# Patient Record
Sex: FEMALE | Race: WHITE | NOT HISPANIC OR LATINO
[De-identification: names, ages, dates, MRNs, and addresses within clinical notes are randomized per-mention and may not be internally consistent; named-entity substitution may affect disease eponyms.]

---

## 2024-07-05 ENCOUNTER — NON-APPOINTMENT (OUTPATIENT)
Age: 39
End: 2024-07-05

## 2024-07-05 PROBLEM — Z00.00 ENCOUNTER FOR PREVENTIVE HEALTH EXAMINATION: Status: ACTIVE | Noted: 2024-07-05

## 2024-08-26 ENCOUNTER — APPOINTMENT (OUTPATIENT)
Dept: BREAST CENTER | Facility: CLINIC | Age: 39
End: 2024-08-26
Payer: COMMERCIAL

## 2024-08-26 VITALS
WEIGHT: 152 LBS | HEART RATE: 85 BPM | DIASTOLIC BLOOD PRESSURE: 69 MMHG | SYSTOLIC BLOOD PRESSURE: 116 MMHG | HEIGHT: 63 IN | OXYGEN SATURATION: 98 % | BODY MASS INDEX: 26.93 KG/M2

## 2024-08-26 DIAGNOSIS — Z80.1 FAMILY HISTORY OF MALIGNANT NEOPLASM OF TRACHEA, BRONCHUS AND LUNG: ICD-10-CM

## 2024-08-26 DIAGNOSIS — Z80.0 FAMILY HISTORY OF MALIGNANT NEOPLASM OF DIGESTIVE ORGANS: ICD-10-CM

## 2024-08-26 DIAGNOSIS — R92.30 INCONCLUSIVE MAMMOGRAM: ICD-10-CM

## 2024-08-26 DIAGNOSIS — Z80.3 FAMILY HISTORY OF MALIGNANT NEOPLASM OF BREAST: ICD-10-CM

## 2024-08-26 DIAGNOSIS — K90.0 CELIAC DISEASE: ICD-10-CM

## 2024-08-26 DIAGNOSIS — Z87.891 PERSONAL HISTORY OF NICOTINE DEPENDENCE: ICD-10-CM

## 2024-08-26 DIAGNOSIS — Z86.19 PERSONAL HISTORY OF OTHER INFECTIOUS AND PARASITIC DISEASES: ICD-10-CM

## 2024-08-26 DIAGNOSIS — Z12.39 ENCOUNTER FOR OTHER SCREENING FOR MALIGNANT NEOPLASM OF BREAST: ICD-10-CM

## 2024-08-26 DIAGNOSIS — N64.89 OTHER SPECIFIED DISORDERS OF BREAST: ICD-10-CM

## 2024-08-26 DIAGNOSIS — Z78.9 OTHER SPECIFIED HEALTH STATUS: ICD-10-CM

## 2024-08-26 DIAGNOSIS — R92.8 OTHER ABNORMAL AND INCONCLUSIVE FINDINGS ON DIAGNOSTIC IMAGING OF BREAST: ICD-10-CM

## 2024-08-26 DIAGNOSIS — R92.2 INCONCLUSIVE MAMMOGRAM: ICD-10-CM

## 2024-08-26 PROCEDURE — 99204 OFFICE O/P NEW MOD 45 MIN: CPT

## 2024-08-26 RX ORDER — VALACYCLOVIR HYDROCHLORIDE 1 G/1
1 TABLET, FILM COATED ORAL
Refills: 0 | Status: ACTIVE | COMMUNITY

## 2024-08-26 NOTE — REASON FOR VISIT
[Initial Evaluation] : an initial evaluation [FreeTextEntry1] : The patient is a premenopausal white female with [place] citizen and [place] descent and a strong family history of breast cancer who has been getting close surveillance and has had a couple bilateral breast biopsies which have been benign.  She comes in now to establish follow-up for close breast cancer screening/surveillance.

## 2024-08-26 NOTE — PHYSICAL EXAM
[Normocephalic] : normocephalic [Atraumatic] : atraumatic [EOMI] : extra ocular movement intact [Supple] : supple [No Supraclavicular Adenopathy] : no supraclavicular adenopathy [No Cervical Adenopathy] : no cervical adenopathy [Examined in the supine and seated position] : examined in the supine and seated position [No dominant masses] : no dominant masses in right breast  [No dominant masses] : no dominant masses left breast [No Nipple Retraction] : no left nipple retraction [No Nipple Discharge] : no left nipple discharge [Breast Mass Right Breast ___cm] : no masses [Breast Mass Left Breast ___cm] : no masses [No Axillary Lymphadenopathy] : no left axillary lymphadenopathy [No Edema] : no edema [No Rashes] : no rashes [No Ulceration] : no ulceration [Breast Nipple Inversion] : nipples not inverted [Breast Nipple Retraction] : nipples not retracted [Breast Nipple Flattening] : nipples not flattened [Breast Nipple Fissures] : nipples not fissured [Breast Abnormal Lactation (Galactorrhea)] : no galactorrhea [Breast Abnormal Secretion Bloody Fluid] : no bloody discharge [Breast Abnormal Secretion Serous Fluid] : no serous discharge [Breast Abnormal Secretion Opalescent Fluid] : no milky discharge [de-identified] : On exam, the patient has ptotic B-cup breasts.  On palpation, I cannot feel any suspicious densities in either breast and she has typical fibroglandular nodularity.  She has no axillary, supraclavicular, or cervical adenopathy.

## 2024-08-26 NOTE — ASSESSMENT
[FreeTextEntry1] : The patient is a 39-year-old G3, P2 premenopausal white female with [place] and [place] descent.  She underwent menarche at age 13 and had her first child at age 33.  She did breast-feed her 2 children.  She has a strong family history with her mother had breast cancer at age 66.  Her father had liver cancer at age 74.  Her maternal grandmother had breast cancer at age 43.  She has 3 maternal aunts who had breast cancer at ages 38, 53, and an unknown age.  Her paternal grandmother had lung and bone cancer.  The patient herself underwent [facility] genetic panel testing on September 14, 2018 which was negative.  She did smoke socially but quit at age 29.  She started screening with ultrasound at age 31 and had a baseline screening mammography around age 35 prior to her second child.  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 She has a calculated lifetime Ashley model risk of breast cancer of 29.9% and 5-year risk of 2.7%. She underwent her last bilateral mammography and ultrasound at [facility] on December 14, 2023 just showing dense changes and stable benign findings.  She underwent her last bilateral breast MRI on June 27, 2024 at [facility] showing an area of enhancement in the medial aspect of the left breast and there was an attempt to undergo an MRI guided core biopsy on July 24, 2024 however the area was no longer seen and the biopsy was aborted.  I reviewed her imaging reports and indeed she does have dense breasts.  On exam, she has fibroglandular nodularity but no suspicious findings and no lymphadenopathy.  I spoke to the patient at length regarding risk reduction strategies for breast cancer.  She understands the need for close surveillance and has been trying to get routine annual mammography and ultrasound and MRIs and staggering the studies.  I did speak to her about tamoxifen for risk reduction and she is aware but given the risk/side effect profile she declined.  I spoke to her as well about breast reduction prophylactic mastectomy but given her lack of any genetic mutation I cannot give her any absolute medical indication for moving forward with surgery.  I am suggesting continuing close surveillance and she should obtain her routine bilateral mammography and ultrasound again around December 2024 and she was given a prescription and she would like to start getting her studies here at [facility].  She will also need to get a 6-month follow-up diagnostic bilateral breast MRI which will be due around January 2025 and she was asked to give the office a call so we can get preapproval and this can be arranged.  If that is unchanged and negative she can just see me on a yearly basis and should be seeing her gynecologist, Dr. Sonu Peguero yearly as well and try to stagger her exams to get 6-month follow-up.  She understands and agrees to proceed as planned.

## 2024-08-26 NOTE — HISTORY OF PRESENT ILLNESS
[FreeTextEntry1] : The patient is a 39-year-old G3, P2 premenopausal white female with [place] and [place] descent.  She underwent menarche at age 13 and had her first child at age 33.  She did breast-feed her 2 children.  She has a strong family history with her mother had breast cancer at age 66.  Her father had liver cancer at age 74.  Her maternal grandmother had breast cancer at age 43.  She has 3 maternal aunts who had breast cancer at ages 38, 53, and an unknown age.  Her paternal grandmother had lung and bone cancer.  The patient herself underwent [facility] genetic panel testing on September 14, 2018 which was negative.  She did smoke socially but quit at age 29.  She started screening with ultrasound at age 31 and had a baseline screening mammography around age 35 prior to her second child.  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  She has a calculated lifetime Ashley model risk of breast cancer of 29.9% and 5-year risk of 2.7%.  She underwent her last bilateral mammography and ultrasound at [facility] on December 14, 2023 just showing dense changes and stable benign findings.  She underwent her last bilateral breast MRI on June 27, 2024 at [facility] showing an area of enhancement in the medial aspect of the left breast and there was an attempt to undergo an MRI guided core biopsy on July 24, 2024 however the area was no longer seen and the biopsy was aborted.  She comes in now to establish close surveillance for breast cancer screening/surveillance.

## 2024-08-26 NOTE — ADDENDUM
[FreeTextEntry1] : I spent greater than 75% the consultation in face-to-face counseling and coordination of care in this patient with a strong family history of breast cancer who comes in for high risk breast cancer screening/surveillance.

## 2024-11-20 ENCOUNTER — NON-APPOINTMENT (OUTPATIENT)
Age: 39
End: 2024-11-20

## 2024-12-17 ENCOUNTER — RESULT REVIEW (OUTPATIENT)
Age: 39
End: 2024-12-17

## 2025-02-03 ENCOUNTER — RESULT REVIEW (OUTPATIENT)
Age: 40
End: 2025-02-03

## 2025-02-06 ENCOUNTER — NON-APPOINTMENT (OUTPATIENT)
Age: 40
End: 2025-02-06

## 2025-08-21 ENCOUNTER — NON-APPOINTMENT (OUTPATIENT)
Age: 40
End: 2025-08-21

## 2025-08-25 ENCOUNTER — NON-APPOINTMENT (OUTPATIENT)
Age: 40
End: 2025-08-25

## 2025-08-26 ENCOUNTER — RESULT REVIEW (OUTPATIENT)
Age: 40
End: 2025-08-26

## 2025-08-27 ENCOUNTER — APPOINTMENT (OUTPATIENT)
Dept: BREAST CENTER | Facility: CLINIC | Age: 40
End: 2025-08-27
Payer: COMMERCIAL

## 2025-08-27 VITALS
BODY MASS INDEX: 30.43 KG/M2 | HEART RATE: 63 BPM | SYSTOLIC BLOOD PRESSURE: 106 MMHG | WEIGHT: 155 LBS | DIASTOLIC BLOOD PRESSURE: 62 MMHG | OXYGEN SATURATION: 99 % | HEIGHT: 60 IN

## 2025-08-27 DIAGNOSIS — Z12.39 ENCOUNTER FOR OTHER SCREENING FOR MALIGNANT NEOPLASM OF BREAST: ICD-10-CM

## 2025-08-27 DIAGNOSIS — Z80.3 FAMILY HISTORY OF MALIGNANT NEOPLASM OF BREAST: ICD-10-CM

## 2025-08-27 DIAGNOSIS — N64.89 OTHER SPECIFIED DISORDERS OF BREAST: ICD-10-CM

## 2025-08-27 DIAGNOSIS — R92.8 OTHER ABNORMAL AND INCONCLUSIVE FINDINGS ON DIAGNOSTIC IMAGING OF BREAST: ICD-10-CM

## 2025-08-27 PROCEDURE — 99213 OFFICE O/P EST LOW 20 MIN: CPT

## 2025-09-12 ENCOUNTER — RESULT REVIEW (OUTPATIENT)
Age: 40
End: 2025-09-12

## 2025-09-19 ENCOUNTER — RESULT REVIEW (OUTPATIENT)
Age: 40
End: 2025-09-19